# Patient Record
Sex: MALE | Race: WHITE | NOT HISPANIC OR LATINO | Employment: FULL TIME | URBAN - METROPOLITAN AREA
[De-identification: names, ages, dates, MRNs, and addresses within clinical notes are randomized per-mention and may not be internally consistent; named-entity substitution may affect disease eponyms.]

---

## 2022-03-05 ENCOUNTER — APPOINTMENT (OUTPATIENT)
Dept: GENERAL RADIOLOGY | Facility: CLINIC | Age: 32
End: 2022-03-05
Attending: PHYSICIAN ASSISTANT
Payer: COMMERCIAL

## 2022-03-05 ENCOUNTER — HOSPITAL ENCOUNTER (EMERGENCY)
Facility: CLINIC | Age: 32
Discharge: HOME | End: 2022-03-05
Attending: PHYSICIAN ASSISTANT | Admitting: PHYSICIAN ASSISTANT
Payer: COMMERCIAL

## 2022-03-05 VITALS
TEMPERATURE: 97.9 F | RESPIRATION RATE: 20 BRPM | OXYGEN SATURATION: 98 % | DIASTOLIC BLOOD PRESSURE: 100 MMHG | HEART RATE: 98 BPM | WEIGHT: 180 LBS | SYSTOLIC BLOOD PRESSURE: 146 MMHG

## 2022-03-05 DIAGNOSIS — M25.572 PAIN IN JOINT INVOLVING ANKLE AND FOOT, LEFT: ICD-10-CM

## 2022-03-05 PROCEDURE — 73630 X-RAY EXAM OF FOOT: CPT | Mod: LT

## 2022-03-05 PROCEDURE — 99283 EMERGENCY DEPT VISIT LOW MDM: CPT

## 2022-03-05 PROCEDURE — 73610 X-RAY EXAM OF ANKLE: CPT | Mod: LT

## 2022-03-05 ASSESSMENT — ENCOUNTER SYMPTOMS
NECK PAIN: 0
ARTHRALGIAS: 1

## 2022-03-05 NOTE — ED TRIAGE NOTES
Pt presents after slipping on ice this morning around 0600 and now has left ankle pain. Is limping, but ambulatory.

## 2022-03-05 NOTE — DISCHARGE INSTRUCTIONS
For pain, you can take up to 1000 mg or 1 g of Tylenol.  You can take 600 mg of ibuprofen at one time.  You can alternate these medications every 3 hours.  Always take ibuprofen with food.  Never take more than 4 g (4000 mg) of Tylenol or 3200mg of ibuprofen in one day.  Do not take this amount for more than 1 week at a time.     Use splint as needed for comfort.

## 2022-03-05 NOTE — ED PROVIDER NOTES
History   Chief Complaint:  Ankle Pain       HPI   Boone Garner is a 32 year old male who presents with ankle pain. The patient reports he slipped on ice this morning at 0600. He states since he has had increasing pain and swelling in his left ankle. The patient reports his pain is currently an 8/10. He states that he is having some difficulty weight bearing but is able to ambulate. The patient denies hitting his head, neck pain or loss of consciousness. He denies any other injuries from his fall.     Review of Systems   Musculoskeletal: Positive for arthralgias (left ankle). Negative for neck pain.   Neurological: Negative for syncope.   All other systems reviewed and are negative.      Allergies:  Ibuprofen     Medications:    Medications reviewed. No current medications.     Past Medical History:    Medical history reviewed. No pertinent medical history.    Past Surgical History:    Surgical history reviewed. No pertinent surgical history.    Family History:    Family history reviewed. No pertinent family history.      Social History:  The patient presents alone.     Physical Exam     Patient Vitals for the past 24 hrs:   BP Temp Temp src Pulse Resp SpO2 Weight   03/05/22 1625 (!) 146/100 97.9  F (36.6  C) Oral 98 20 98 % 81.6 kg (180 lb)       Physical Exam  HENT: mmm  Eyes: PERRL B/L  Neck: Supple  CV: Peripheral pulses in tact and regular  Resp: Speaking in full sentences without any respiratory distress  Ext:  Left ANKLE    Mild TTP over the inferior 6 cm of the posterior medial malleolus  TTP over the inferior 6 cm of the posterior lateral malleolus  No TTP over the navicular.  No TTP base of 5th MT  No TTP fibular head  Mild TTP just superior to most poisterior aspect of calcaneous  Mild soft tissue swelling present over lateral malleolus.  This is a closed injury  Able to fire toe flexors and extensors  Sensation and perfusion intact throughout foot    Remainder of the skeletal survey is  unremarkable    Skin: warm dry well perfused  Neuro: Alert, no gross motor or sensory deficits, gait stable  Psych: Calm  Nursing notes and vital signs reviewed.      Emergency Department Course     Imaging:  Foot XR, G/E 3 views, left   Final Result   IMPRESSION: No acute fracture of the left foot or ankle. Ankle mortise is intact. Joint spaces of the foot are maintained.      XR Ankle Left G/E 3 Views   Final Result   IMPRESSION: No acute fracture of the left foot or ankle. Ankle mortise is intact. Joint spaces of the foot are maintained.        Report per radiology    Procedures    Emergency Department Course:    Reviewed:  I reviewed nursing notes, vitals and past medical history    Assessments:  1629 I obtained history and examined the patient as noted above.   1724 I rechecked the patient and explained findings.     Disposition:  The patient was discharged to home.     Impression & Plan     CMS Diagnoses: None    Medical Decision Making:  Boone Garner is a 32 year old male who presents to ED with ankle pain after a fall.  No drugs head, no LOC.  He is able to bear weight, however this is painful.  See HPI as above for additional details.  Vitals and physical exam as above.  Differential is broad include fracture, sprain, strain, dislocation, among others.  Imaging obtained as above is negative for acute bony abnormality.  Suspect soft tissue injury.  Gel splint provided for home.  Advised Tylenol and ibuprofen,  RICE. Discussed reasons to return. All questions answered. Patient discharged to home in stable condition.    Diagnosis:    ICD-10-CM    1. Pain in joint involving ankle and foot, left  M25.572        Scribe Disclosure:  I, Natasha Lee, am serving as a scribe at 4:32 PM on 3/5/2022 to document services personally performed by Aniket Zhao PA-C based on my observations and the provider's statements to me.     This record was created at least in part using electronic voice recognition software,  so please excuse any typographical errors.           Aniket Zhao PA-C  03/05/22 3349

## 2022-04-03 ENCOUNTER — HEALTH MAINTENANCE LETTER (OUTPATIENT)
Age: 32
End: 2022-04-03

## 2022-10-03 ENCOUNTER — HEALTH MAINTENANCE LETTER (OUTPATIENT)
Age: 32
End: 2022-10-03

## 2023-02-12 ENCOUNTER — HEALTH MAINTENANCE LETTER (OUTPATIENT)
Age: 33
End: 2023-02-12

## 2024-03-10 ENCOUNTER — HEALTH MAINTENANCE LETTER (OUTPATIENT)
Age: 34
End: 2024-03-10